# Patient Record
Sex: FEMALE | Race: ASIAN | NOT HISPANIC OR LATINO | URBAN - METROPOLITAN AREA
[De-identification: names, ages, dates, MRNs, and addresses within clinical notes are randomized per-mention and may not be internally consistent; named-entity substitution may affect disease eponyms.]

---

## 2022-11-14 PROBLEM — E03.9 HYPOTHYROIDISM: Status: ACTIVE | Noted: 2022-11-14

## 2022-11-14 PROBLEM — E11.9 TYPE 2 DIABETES MELLITUS (CMD): Status: ACTIVE | Noted: 2022-11-14

## 2023-04-25 ENCOUNTER — WALK IN (OUTPATIENT)
Dept: URGENT CARE | Age: 43
End: 2023-04-25

## 2023-04-25 VITALS
TEMPERATURE: 98.3 F | BODY MASS INDEX: 32.39 KG/M2 | RESPIRATION RATE: 18 BRPM | OXYGEN SATURATION: 100 % | HEIGHT: 62 IN | WEIGHT: 176 LBS | SYSTOLIC BLOOD PRESSURE: 126 MMHG | HEART RATE: 90 BPM | DIASTOLIC BLOOD PRESSURE: 82 MMHG

## 2023-04-25 DIAGNOSIS — J30.89 SEASONAL ALLERGIC RHINITIS DUE TO OTHER ALLERGIC TRIGGER: Primary | ICD-10-CM

## 2023-04-25 DIAGNOSIS — R09.81 CONGESTION OF NASAL SINUS: ICD-10-CM

## 2023-04-25 LAB
INTERNAL PROCEDURAL CONTROLS ACCEPTABLE: YES
S PYO AG THROAT QL IA.RAPID: NEGATIVE
TEST LOT EXPIRATION DATE: NORMAL
TEST LOT NUMBER: NORMAL

## 2023-04-25 PROCEDURE — 99203 OFFICE O/P NEW LOW 30 MIN: CPT | Performed by: NURSE PRACTITIONER

## 2023-04-25 PROCEDURE — 3079F DIAST BP 80-89 MM HG: CPT | Performed by: NURSE PRACTITIONER

## 2023-04-25 PROCEDURE — 87880 STREP A ASSAY W/OPTIC: CPT | Performed by: NURSE PRACTITIONER

## 2023-04-25 PROCEDURE — 3074F SYST BP LT 130 MM HG: CPT | Performed by: NURSE PRACTITIONER

## 2023-04-25 ASSESSMENT — ENCOUNTER SYMPTOMS
WHEEZING: 0
FATIGUE: 0
ALLERGIC/IMMUNOLOGIC NEGATIVE: 1
SORE THROAT: 1
RESPIRATORY NEGATIVE: 1
EYE ITCHING: 1
HEADACHES: 0
SINUS PAIN: 0
RHINORRHEA: 1
COUGH: 0
CONSTITUTIONAL NEGATIVE: 1
FEVER: 0

## 2024-04-02 ENCOUNTER — OFFICE VISIT (OUTPATIENT)
Dept: FAMILY MEDICINE CLINIC | Facility: CLINIC | Age: 44
End: 2024-04-02
Payer: COMMERCIAL

## 2024-04-02 VITALS
BODY MASS INDEX: 30.65 KG/M2 | WEIGHT: 173 LBS | OXYGEN SATURATION: 98 % | SYSTOLIC BLOOD PRESSURE: 112 MMHG | HEIGHT: 63 IN | HEART RATE: 90 BPM | DIASTOLIC BLOOD PRESSURE: 74 MMHG

## 2024-04-02 DIAGNOSIS — Z00.00 ANNUAL PHYSICAL EXAM: Primary | ICD-10-CM

## 2024-04-02 DIAGNOSIS — E55.9 VITAMIN D DEFICIENCY: ICD-10-CM

## 2024-04-02 DIAGNOSIS — Z12.31 ENCOUNTER FOR SCREENING MAMMOGRAM FOR MALIGNANT NEOPLASM OF BREAST: ICD-10-CM

## 2024-04-02 DIAGNOSIS — Z12.11 COLON CANCER SCREENING: ICD-10-CM

## 2024-04-02 PROCEDURE — 99386 PREV VISIT NEW AGE 40-64: CPT | Performed by: FAMILY MEDICINE

## 2024-04-02 RX ORDER — GLIMEPIRIDE 4 MG/1
4 TABLET ORAL EVERY 12 HOURS
COMMUNITY

## 2024-04-02 RX ORDER — LEVOTHYROXINE SODIUM 0.07 MG/1
75 TABLET ORAL DAILY
COMMUNITY

## 2024-04-02 RX ORDER — SEMAGLUTIDE 2.68 MG/ML
2 INJECTION, SOLUTION SUBCUTANEOUS
COMMUNITY
Start: 2024-01-31

## 2024-04-02 RX ORDER — DAPAGLIFLOZIN 10 MG/1
10 TABLET, FILM COATED ORAL DAILY
COMMUNITY

## 2024-04-02 NOTE — H&P
HPI:   Laura Peterson is a 43 year old female who presents for a well woman exam. Symptoms: denies discharge, itching, burning or dysuria.    Patient's last menstrual period was 03/15/2024 (approximate).  Previous pap:   Health Maintenance   Topic Date Due    Pap Smear  Never done      Performs SBEs: yes  Contraception: none                        Current Outpatient Medications   Medication Sig Dispense Refill    metFORMIN 500 MG Oral Tab Take 2 tablets (1,000 mg total) by mouth 2 (two) times daily with meals.      semaglutide (OZEMPIC, 2 MG/DOSE,) 8 MG/3ML Subcutaneous Solution Pen-injector Inject 2 mg into the skin every 7 days.      glimepiride 4 MG Oral Tab Take 1 tablet (4 mg total) by mouth Q12H.      cholecalciferol 125 MCG (5000 UT) Oral Tab Take 1 tablet (125 mcg total) by mouth daily.      levothyroxine 75 MCG Oral Tab Take 1 tablet (75 mcg total) by mouth daily.      FARXIGA 10 MG Oral Tab Take 1 tablet (10 mg total) by mouth daily.        Past Medical History:   Diagnosis Date    Diabetes (HCC)     Hypothyroidism       Past Surgical History:   Procedure Laterality Date          During first Delivary      No family history on file.   Social History:   Social History     Socioeconomic History    Marital status:    Tobacco Use    Smoking status: Never    Smokeless tobacco: Never   Substance and Sexual Activity    Alcohol use: Never    Drug use: Never   Other Topics Concern    Caffeine Concern No    Exercise No    Seat Belt No    Special Diet No    Stress Concern No    Weight Concern Yes     Comment: Need to lower weight     Exercise: 3 days a week walking Diet: watches sugar closely     REVIEW OF SYSTEMS:   GENERAL: feels well otherwise  SKIN: denies unusual skin lesions  HEENT:no vision or hearing changes; denies nasal congestion, sinus pain or sore throat  LUNGS: denies shortness of breath, chest heaviness or cough  CV: denies chest pain, pressure or palpitations  GI: denies  abdominal pain; denies heartburn, frequent diarrhea or constipation  : denies dysuria, vaginal discharge or itching; denies pelvic pain  MS: denies back pain  NEURO: denies headaches; no dizziness  PSYCH: denies depression or anxiety  HEME: denies hx of anemia  ENDOCRINE: denies thyroid history, denies excessive thirst, denies significant weight change  ALL/ASTHMA: denies hx of  allergy or asthma    EXAM:   /74   Pulse 90   Ht 5' 3\" (1.6 m)   Wt 173 lb (78.5 kg)   LMP 03/15/2024 (Approximate)   SpO2 98%   BMI 30.65 kg/m²       Body mass index is 30.65 kg/m².      GENERAL: pleasant and in no acute distress  SKIN: warm and dry  HEENT: atraumatic, normocephalic; PERRLA, conjunctiva clear; ears, nose and throat are clear  NECK: supple,no adenopathy,no thyromegaly  BREASTS: .deferred  LUNGS: clear to auscultation, easy breathing  CV: normal S1S2, RRR without murmur  GI: BSs present, no tenderness or organomegaly  : Deferred.    MS: Randall, no bony deformities  EXT: no cyanosis, clubbing or edema  NEURO: A&Ox3, motor and sensory are grossly intact, good coordination; no tremors    ASSESSMENT AND PLAN:     Laura was given an opportunity to ask questions and verbalized understanding of care. Follow up

## 2024-04-06 ENCOUNTER — OFFICE VISIT (OUTPATIENT)
Dept: FAMILY MEDICINE CLINIC | Facility: CLINIC | Age: 44
End: 2024-04-06
Payer: COMMERCIAL

## 2024-04-06 VITALS
HEIGHT: 63 IN | BODY MASS INDEX: 30.65 KG/M2 | HEART RATE: 100 BPM | SYSTOLIC BLOOD PRESSURE: 106 MMHG | RESPIRATION RATE: 16 BRPM | DIASTOLIC BLOOD PRESSURE: 68 MMHG | WEIGHT: 173 LBS | OXYGEN SATURATION: 98 %

## 2024-04-06 DIAGNOSIS — Z12.4 CERVICAL CANCER SCREENING: Primary | ICD-10-CM

## 2024-04-06 PROCEDURE — 88175 CYTOPATH C/V AUTO FLUID REDO: CPT | Performed by: FAMILY MEDICINE

## 2024-04-06 PROCEDURE — 87624 HPV HI-RISK TYP POOLED RSLT: CPT | Performed by: FAMILY MEDICINE

## 2024-04-06 NOTE — PROGRESS NOTES
Subjective:   Patient ID: Laura Peterson is a 43 year old female.    Here for PAP smear and breast exam.  Patient's last menstrual period was 03/15/2024 (approximate).   On exam noted she is having vaginal bleeding.  Pt was not aware.        History/Other:   Review of Systems   All other systems reviewed and are negative.    Current Outpatient Medications   Medication Sig Dispense Refill    glimepiride 4 MG Oral Tab Take 1 tablet (4 mg total) by mouth Q12H.      metFORMIN 500 MG Oral Tab Take 2 tablets (1,000 mg total) by mouth 2 (two) times daily with meals.      cholecalciferol 125 MCG (5000 UT) Oral Tab Take 1 tablet (125 mcg total) by mouth daily.      levothyroxine 75 MCG Oral Tab Take 1 tablet (75 mcg total) by mouth daily.      semaglutide (OZEMPIC, 2 MG/DOSE,) 8 MG/3ML Subcutaneous Solution Pen-injector Inject 2 mg into the skin every 7 days.      FARXIGA 10 MG Oral Tab Take 1 tablet (10 mg total) by mouth daily.       Allergies:No Known Allergies    Objective:   Physical Exam  Vitals reviewed.   Constitutional:       General: She is not in acute distress.     Appearance: She is well-developed. She is not diaphoretic.   Eyes:      General: No scleral icterus.        Right eye: No discharge.         Left eye: No discharge.      Conjunctiva/sclera: Conjunctivae normal.   Cardiovascular:      Rate and Rhythm: Normal rate and regular rhythm.      Heart sounds: Normal heart sounds. No murmur heard.     No friction rub. No gallop.   Pulmonary:      Effort: Pulmonary effort is normal. No respiratory distress.      Breath sounds: Normal breath sounds. No wheezing or rales.   Genitourinary:     General: Normal vulva.      Vagina: No vaginal discharge.      Comments: + vaginal bleeding, menses.      Assessment & Plan:   1. Cervical cancer screening      Follow up if periods stay irregular.  May need to repeat PAP if results are not adequate as she was having menses unknowningly.    Orders Placed This Encounter    Procedures    Hpv High Risk , Thin Prep Collection    ThinPrep PAP Smear       Meds This Visit:  Requested Prescriptions      No prescriptions requested or ordered in this encounter       Imaging & Referrals:  None

## 2024-04-08 LAB — HPV I/H RISK 1 DNA SPEC QL NAA+PROBE: NEGATIVE

## 2024-04-11 ENCOUNTER — HOSPITAL ENCOUNTER (OUTPATIENT)
Dept: MAMMOGRAPHY | Age: 44
Discharge: HOME | End: 2024-04-11
Attending: FAMILY MEDICINE
Payer: COMMERCIAL

## 2024-04-11 DIAGNOSIS — Z12.31 ENCOUNTER FOR SCREENING MAMMOGRAM FOR MALIGNANT NEOPLASM OF BREAST: ICD-10-CM

## 2024-04-11 PROCEDURE — 77063 BREAST TOMOSYNTHESIS BI: CPT | Performed by: FAMILY MEDICINE

## 2024-04-11 PROCEDURE — 77067 SCR MAMMO BI INCL CAD: CPT | Performed by: FAMILY MEDICINE

## 2024-04-12 LAB
.: NORMAL
.: NORMAL

## 2024-04-19 ENCOUNTER — HOSPITAL ENCOUNTER (OUTPATIENT)
Dept: MAMMOGRAPHY | Facility: HOSPITAL | Age: 44
Discharge: HOME | End: 2024-04-19
Attending: FAMILY MEDICINE
Payer: COMMERCIAL

## 2024-04-19 DIAGNOSIS — R92.2 INCONCLUSIVE MAMMOGRAM: ICD-10-CM

## 2024-04-19 PROCEDURE — 76641 ULTRASOUND BREAST COMPLETE: CPT | Performed by: FAMILY MEDICINE

## 2024-05-26 LAB
ABSOLUTE BASOPHILS: 41 CELLS/UL (ref 0–200)
ABSOLUTE EOSINOPHILS: 221 CELLS/UL (ref 15–500)
ABSOLUTE LYMPHOCYTES: 2534 CELLS/UL (ref 850–3900)
ABSOLUTE MONOCYTES: 394 CELLS/UL (ref 200–950)
ABSOLUTE NEUTROPHILS: 5010 CELLS/UL (ref 1500–7800)
BASOPHILS: 0.5 %
EOSINOPHILS: 2.7 %
HEMATOCRIT: 42 % (ref 35–45)
HEMOGLOBIN: 13.4 G/DL (ref 11.7–15.5)
LYMPHOCYTES: 30.9 %
MCH: 26.5 PG (ref 27–33)
MCHC: 31.9 G/DL (ref 32–36)
MCV: 83 FL (ref 80–100)
MONOCYTES: 4.8 %
MPV: 10.8 FL (ref 7.5–12.5)
NEUTROPHILS: 61.1 %
PLATELET COUNT: 278 THOUSAND/UL (ref 140–400)
RDW: 14 % (ref 11–15)
RED BLOOD CELL COUNT: 5.06 MILLION/UL (ref 3.8–5.1)
T4, FREE: 1.3 NG/DL (ref 0.8–1.8)
TSH W/REFLEX TO FT4: 5.73 MIU/L
VITAMIN D, 25-OH, TOTAL: 31 NG/ML (ref 30–100)
WHITE BLOOD CELL COUNT: 8.2 THOUSAND/UL (ref 3.8–10.8)